# Patient Record
Sex: FEMALE | Race: WHITE | Employment: FULL TIME | URBAN - METROPOLITAN AREA
[De-identification: names, ages, dates, MRNs, and addresses within clinical notes are randomized per-mention and may not be internally consistent; named-entity substitution may affect disease eponyms.]

---
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
## 2017-01-24 ENCOUNTER — OFFICE VISIT (OUTPATIENT)
Dept: FAMILY MEDICINE | Facility: CLINIC | Age: 47
End: 2017-01-24
Payer: COMMERCIAL

## 2017-01-24 VITALS
OXYGEN SATURATION: 98 % | TEMPERATURE: 97 F | HEART RATE: 77 BPM | HEIGHT: 65 IN | SYSTOLIC BLOOD PRESSURE: 104 MMHG | DIASTOLIC BLOOD PRESSURE: 80 MMHG | WEIGHT: 139.7 LBS | BODY MASS INDEX: 23.28 KG/M2

## 2017-01-24 DIAGNOSIS — E87.6 HYPOKALEMIA: ICD-10-CM

## 2017-01-24 DIAGNOSIS — F33.1 MAJOR DEPRESSIVE DISORDER, RECURRENT EPISODE, MODERATE (H): ICD-10-CM

## 2017-01-24 DIAGNOSIS — F43.0 ACUTE REACTION TO STRESS: Primary | ICD-10-CM

## 2017-01-24 DIAGNOSIS — F43.10 PTSD (POST-TRAUMATIC STRESS DISORDER): ICD-10-CM

## 2017-01-24 PROCEDURE — 99214 OFFICE O/P EST MOD 30 MIN: CPT | Performed by: PHYSICIAN ASSISTANT

## 2017-01-24 ASSESSMENT — ANXIETY QUESTIONNAIRES
6. BECOMING EASILY ANNOYED OR IRRITABLE: MORE THAN HALF THE DAYS
7. FEELING AFRAID AS IF SOMETHING AWFUL MIGHT HAPPEN: SEVERAL DAYS
5. BEING SO RESTLESS THAT IT IS HARD TO SIT STILL: SEVERAL DAYS
1. FEELING NERVOUS, ANXIOUS, OR ON EDGE: SEVERAL DAYS
2. NOT BEING ABLE TO STOP OR CONTROL WORRYING: SEVERAL DAYS
3. WORRYING TOO MUCH ABOUT DIFFERENT THINGS: SEVERAL DAYS
GAD7 TOTAL SCORE: 8

## 2017-01-24 ASSESSMENT — PATIENT HEALTH QUESTIONNAIRE - PHQ9: 5. POOR APPETITE OR OVEREATING: SEVERAL DAYS

## 2017-01-24 NOTE — MR AVS SNAPSHOT
After Visit Summary   1/24/2017    Alysa Sarkar    MRN: [id number]           Patient Information     Date Of Birth          1970        Visit Information        Provider Department      1/24/2017 9:20 AM Vanessa Mancini PA-C [facility] [place]        Today's Diagnoses     Acute reaction to stress    -  1     Major depressive disorder, recurrent episode, moderate (H)         PTSD (post-traumatic stress disorder)         Hypokalemia           Care Instructions    Increase prozac to 60 mg daily  Continue depakote  Follow up with psychiatry  Return to clinic for any new or worsening symptoms or go to ER Urgent care in off hours            Follow-ups after your visit        Additional Services     MENTAL HEALTH REFERRAL       Your provider has referred you to: [facility]: [facility] Services - [facility] [phone]   [email or web address]   *Referral from [facility] Primary Care Provider required - Consultation Model - medication management & future refills will be returned to [facility] PCP upon completion of evaluation  *Please call to schedule an appointment.  [facility]: Psychiatry Clinic - [place] [phone]   [email or web address]    All scheduling is subject to the client's specific insurance plan & benefits, provider/location availability, and provider clinical specialities.  Please arrive 15 minutes early for your first appointment and bring your completed paperwork.    Please be aware that coverage of these services is subject to the terms and limitations of your health insurance plan.  Call member services at your health plan with any benefit or coverage questions.                  Who to contact     If you have questions or need follow up information about today's clinic visit or your schedule please contact [facility] directly at [phone].  Normal or non-critical lab and imaging results will be communicated to you by MyChart, letter or phone within 4 business days after the clinic has received the results. If you do not hear from us within 7 days, please contact the clinic through [employer] or phone. If you have a critical or abnormal lab result, we will notify you by phone as soon as possible.  Submit refill requests through [employer] or call your pharmacy and they will forward the refill request to us. Please allow 3 business days for your refill to be completed.          Additional Information About Your Visit        [employer] Information     [employer] lets you send messages to your doctor, view your test results, renew your prescriptions, schedule appointments and more. To sign up, go to [email or web address] . Click on \"Log in\" on the left side of the screen, which will take you to the Welcome page. Then click on \"Sign up Now\" on the right side of the page.     You will be asked to enter the access code listed below, as well as some personal information. Please follow the directions to create your username and password.     Your access code is: [email or web address]  Expires: 2017  9:46 AM     Your access code will  in 90 days. If you need help or a new code, please call your [place] clinic or [phone].        Care EveryWhere ID     This is your Care EveryWhere ID. This could be used by other organizations to access your [place] medical records  [id number]        Your Vitals Were     Pulse Temperature Height BMI (Body Mass Index) Pulse Oximetry       77 97  F (36.1  C) (Oral) 5' 5\" (1.651 m) 23.25 kg/m2 98%        Blood Pressure from Last 3 Encounters:   17 104/80   16 132/86    Weight from Last 3 Encounters:   17 139 lb 11.2 oz (63.368 kg)   16 135 lb (61.236 kg)              We Performed the Following     Basic metabolic panel  (Ca, Cl, CO2, Creat, Gluc, K, Na, BUN)     MENTAL " HEALTH REFERRAL          Today's Medication Changes          These changes are accurate as of: 1/24/17  9:46 AM.  If you have any questions, ask your nurse or doctor.               These medicines have changed or have updated prescriptions.        Dose/Directions    * FLUoxetine 40 MG capsule   Commonly known as:  PROzac   This may have changed:  Another medication with the same name was added. Make sure you understand how and when to take each.   Used for:  Mild episode of recurrent major depressive disorder (H)        Dose:  40 mg   Take 1 capsule (40 mg) by mouth daily   Quantity:  90 capsule   Refills:  0       * FLUoxetine 20 MG capsule   Commonly known as:  PROzac   This may have changed:  You were already taking a medication with the same name, and this prescription was added. Make sure you understand how and when to take each.   Used for:  Acute reaction to stress, Major depressive disorder, recurrent episode, moderate (H), PTSD (post-traumatic stress disorder)        Dose:  60 mg   Take 3 capsules (60 mg) by mouth daily   Quantity:  90 capsule   Refills:  3       * Notice:  This list has 2 medication(s) that are the same as other medications prescribed for you. Read the directions carefully, and ask your doctor or other care provider to review them with you.         Where to get your medicines      These medications were sent to [facility] [address] [place] [address] - [address] AT [facility] [address], [facility] [address]     Phone:  [phone]    - FLUoxetine 20 MG capsule             Primary Care Provider    None Specified       No primary provider on file.        Thank you!     Thank you for choosing [facility]  for your care. Our goal is always to provide you with excellent care. Hearing back from our patients is one way we can continue to improve our services. Please take a few minutes to complete the written survey that you may receive in the mail  after your visit with us. Thank you!             Your Updated Medication List - Protect others around you: Learn how to safely use, store and throw away your medicines at www.disposemymeds.org.          This list is accurate as of: 1/24/17  9:46 AM.  Always use your most recent med list.                   Brand Name Dispense Instructions for use    divalproex 500 MG EC tablet    DEPAKOTE     Take 500 mg by mouth daily       * FLUoxetine 40 MG capsule    PROzac    90 capsule    Take 1 capsule (40 mg) by mouth daily       * FLUoxetine 20 MG capsule    PROzac    90 capsule    Take 3 capsules (60 mg) by mouth daily       * Notice:  This list has 2 medication(s) that are the same as other medications prescribed for you. Read the directions carefully, and ask your doctor or other care provider to review them with you.

## 2017-01-24 NOTE — NURSING NOTE
"Chief Complaint   Patient presents with     Depression       Initial /80 mmHg  Pulse 77  Temp(Src) 97  F (36.1  C) (Oral)  Ht 5' 5\" (1.651 m)  Wt 139 lb 11.2 oz (63.368 kg)  BMI 23.25 kg/m2  SpO2 98% Estimated body mass index is 23.25 kg/(m^2) as calculated from the following:    Height as of this encounter: 5' 5\" (1.651 m).    Weight as of this encounter: 139 lb 11.2 oz (63.368 kg).  BP completed using cuff size: regular    Ginny Perez MA      "

## 2017-01-24 NOTE — PATIENT INSTRUCTIONS
Increase prozac to 60 mg daily  Continue depakote  Follow up with psychiatry  Return to clinic for any new or worsening symptoms or go to ER Urgent care in off hours

## 2017-01-25 ASSESSMENT — PATIENT HEALTH QUESTIONNAIRE - PHQ9: SUM OF ALL RESPONSES TO PHQ QUESTIONS 1-9: 8

## 2017-01-25 ASSESSMENT — ANXIETY QUESTIONNAIRES: GAD7 TOTAL SCORE: 8

## 2017-04-18 ENCOUNTER — TELEPHONE (OUTPATIENT)
Dept: FAMILY MEDICINE | Facility: CLINIC | Age: 47
End: 2017-04-18

## 2017-04-18 DIAGNOSIS — N63.20 LEFT BREAST MASS: Primary | ICD-10-CM

## 2017-04-18 NOTE — TELEPHONE ENCOUNTER
Huddle with Vanessa and orders placed. Pt notified that orders are in, scheduling phone number given.    JAMES BrittonN, RN  [facility] [place]

## 2017-04-18 NOTE — TELEPHONE ENCOUNTER
Pt is unable to make an appointment this week after being told she needs to r/s her 4/20 appt and wants to know if someone can just put an order in for a mammogram as she has a lump in her breast    Pt can be reached @ [phone] jennifer

## 2017-04-20 ENCOUNTER — HOSPITAL ENCOUNTER (OUTPATIENT)
Dept: MAMMOGRAPHY | Facility: CLINIC | Age: 47
Discharge: HOME | End: 2017-04-20
Attending: PHYSICIAN ASSISTANT | Admitting: PHYSICIAN ASSISTANT
Payer: COMMERCIAL

## 2017-04-20 ENCOUNTER — HOSPITAL ENCOUNTER (OUTPATIENT)
Dept: MAMMOGRAPHY | Facility: CLINIC | Age: 47
End: 2017-04-20
Attending: PHYSICIAN ASSISTANT
Payer: COMMERCIAL

## 2017-04-20 DIAGNOSIS — N63.20 LEFT BREAST MASS: ICD-10-CM

## 2017-04-20 PROCEDURE — G0204 DX MAMMO INCL CAD BI: HCPCS

## 2017-04-20 PROCEDURE — 76642 ULTRASOUND BREAST LIMITED: CPT | Mod: LT

## 2017-04-20 NOTE — LETTER
[facility]  [address], [address]  [place] [address]  [phone]      April 26, 2017      Alysa Sarkar  [address] [place] [address]          Dear Ms. Sarkar,    The results of your recent lab tests were within normal limits. Enclosed is a copy of these results.  If you have any further questions or problems, please contact our office.    Sincerely,        Vanessa Mancini PA-C        Results for orders placed or performed during the hospital encounter of 04/20/17   MA Diagnostic Bilateral w/Long    Narrative    DIAGNOSTIC MAMMOGRAM, BILATERAL, DIGITAL w/CAD AND TOMOSYNTHESIS -  4/20/2017 8:43 AM  ULTRASOUND LEFT BREAST    HISTORY:  Left breast lump.    COMPARISON:  None.    BREAST DENSITY: Heterogeneously dense.    FINDINGS:  No mammographic findings suspicious for malignancy. A  marker was placed overlying the area of concern in the left breast,  and no underlying mammographic abnormalities appreciated.    Targeted ultrasound evaluation of the left breast at 7:00 3 cm from  the nipple at the site of palpable lump demonstrates no sonographic  abnormalities.      Impression    IMPRESSION: BI-RADS CATEGORY: 1 - NEGATIVE.    RECOMMENDED FOLLOW-UP: Annual Mammography, clinical follow-up.  Recommend routine annual screening mammography. Clinical follow-up is  recommended, with management dependent on the results/findings of the  physical examination.    The results and recommendation were communicated to the patient at the  conclusion of today's appointment.    HORTENSIA MIXON MD

## 2017-05-10 ENCOUNTER — TELEPHONE (OUTPATIENT)
Dept: FAMILY MEDICINE | Facility: CLINIC | Age: 47
End: 2017-05-10

## 2017-05-10 DIAGNOSIS — F33.1 MAJOR DEPRESSIVE DISORDER, RECURRENT EPISODE, MODERATE (H): ICD-10-CM

## 2017-05-10 DIAGNOSIS — F43.10 PTSD (POST-TRAUMATIC STRESS DISORDER): Primary | ICD-10-CM

## 2017-05-10 NOTE — TELEPHONE ENCOUNTER
divalproex (DEPAKOTE) 500 MG EC tablet       Last Written Prescription Date:  N/A - historical  Last Fill Quantity: N/A - historical,   # refills: N/A - Historical  Last Office Visit with [facility], P or [facility] prescribing provider: 1/24/2017  Future Office visit:       Routing refill request to provider for review/approval because:  Medication is reported/historical

## 2017-05-10 NOTE — TELEPHONE ENCOUNTER
Spoke with pt, per last office visit pt was to schedule an annual visit, set up appt for her on 6/12/17    Juliette Merida RN

## 2017-05-10 NOTE — TELEPHONE ENCOUNTER
Reason for call: Other   Patient called regarding (reason for call): appointment  Additional comments: [place], but will be in [place] for dental work on 6/12/17. She would like to know if Vanessa needs to see her for any medication follow up at that time, as she does not want to schedule an appointment if it isn't necessary.     Phone Number Pt can be reached at: Home number on file [phone] (home)  Best Time: Any  Can we leave a detailed message on this number? YES

## 2017-05-10 NOTE — TELEPHONE ENCOUNTER
Routing refill request to provider for review/approval because:  Medication is reported/historical    Pt has an appt scheduled for 6/12/17    Juliette Merida RN

## 2017-05-11 RX ORDER — DIVALPROEX SODIUM 500 MG/1
500 TABLET, DELAYED RELEASE ORAL DAILY
Qty: 60 TABLET | Refills: 0 | Status: SHIPPED | OUTPATIENT
Start: 2017-05-11

## 2019-10-31 ENCOUNTER — ALLIED HEALTH/NURSE VISIT (OUTPATIENT)
Dept: NURSING | Facility: CLINIC | Age: 49
End: 2019-10-31
Payer: COMMERCIAL

## 2019-10-31 DIAGNOSIS — Z11.1 SCREENING FOR TUBERCULOSIS: Primary | ICD-10-CM

## 2019-10-31 PROCEDURE — 86580 TB INTRADERMAL TEST: CPT
